# Patient Record
Sex: MALE | Race: WHITE | NOT HISPANIC OR LATINO | Employment: FULL TIME | URBAN - METROPOLITAN AREA
[De-identification: names, ages, dates, MRNs, and addresses within clinical notes are randomized per-mention and may not be internally consistent; named-entity substitution may affect disease eponyms.]

---

## 2024-06-16 ENCOUNTER — OFFICE VISIT (OUTPATIENT)
Dept: URGENT CARE | Facility: CLINIC | Age: 24
End: 2024-06-16
Payer: COMMERCIAL

## 2024-06-16 VITALS
SYSTOLIC BLOOD PRESSURE: 128 MMHG | OXYGEN SATURATION: 98 % | TEMPERATURE: 98.4 F | HEIGHT: 72 IN | BODY MASS INDEX: 16.5 KG/M2 | HEART RATE: 87 BPM | RESPIRATION RATE: 16 BRPM | WEIGHT: 121.8 LBS | DIASTOLIC BLOOD PRESSURE: 72 MMHG

## 2024-06-16 DIAGNOSIS — M79.18 MYOFASCIAL PAIN SYNDROME, CERVICAL: ICD-10-CM

## 2024-06-16 PROCEDURE — 3074F SYST BP LT 130 MM HG: CPT | Performed by: PHYSICIAN ASSISTANT

## 2024-06-16 PROCEDURE — 99203 OFFICE O/P NEW LOW 30 MIN: CPT | Performed by: PHYSICIAN ASSISTANT

## 2024-06-16 PROCEDURE — 3078F DIAST BP <80 MM HG: CPT | Performed by: PHYSICIAN ASSISTANT

## 2024-06-16 RX ORDER — KETOROLAC TROMETHAMINE 30 MG/ML
15 INJECTION, SOLUTION INTRAMUSCULAR; INTRAVENOUS ONCE
Status: COMPLETED | OUTPATIENT
Start: 2024-06-16 | End: 2024-06-16

## 2024-06-16 RX ORDER — CYCLOBENZAPRINE HCL 10 MG
5-10 TABLET ORAL 3 TIMES DAILY PRN
Qty: 30 TABLET | Refills: 0 | Status: SHIPPED | OUTPATIENT
Start: 2024-06-16

## 2024-06-16 RX ORDER — MELOXICAM 15 MG/1
15 TABLET ORAL DAILY
Qty: 30 TABLET | Refills: 0 | Status: SHIPPED | OUTPATIENT
Start: 2024-06-16

## 2024-06-16 RX ADMIN — KETOROLAC TROMETHAMINE 15 MG: 30 INJECTION, SOLUTION INTRAMUSCULAR; INTRAVENOUS at 17:08

## 2024-06-16 NOTE — LETTER
June 16, 2024    To Whom It May Concern:         This is confirmation that Frank Lan attended his scheduled appointment with Abeba Mccain P.A.-C. on 6/16/24.  Please excuse patient from work today.         If you have any questions please do not hesitate to call me at the phone number listed below.    Sincerely,          Abeba Mccain P.A.-C.  [phone]

## 2024-06-16 NOTE — PROGRESS NOTES
"Subjective:   Frank Lan is a 23 y.o. male who presents for Neck Pain (Neck pain that radiates down neck to left side of shoulders x 3 days)     This a pleasant 23-year-old male who presents with left sided neck pain radiates to upper trapezium associated with slight headache.  No injury or trauma. No n/t/w.  Feels \"sore\".  Tried tylenol and hot showers.  Denies upper extremity symptoms.  Denies bowel or bladder dysfunction or gait disequilibrium.  No saddle anesthesia.        Medications:  azithromycin Tabs  ondansetron Tabs  ROBITUSSIN ALLERGY/COUGH PO    Allergies:             Patient has no known allergies.    Surgical History:       No past surgical history on file.    Past Social Hx:  Frank Lan  reports that he has never smoked. He has never used smokeless tobacco. He reports current alcohol use. He reports that he does not currently use drugs.     Past Family Hx:   Frank Lan family history is not on file.       Problem list, medications, and allergies reviewed by myself today in Epic.     Objective:     /72 (BP Location: Left arm, Patient Position: Sitting, BP Cuff Size: Adult)   Pulse 87   Temp 36.9 °C (98.4 °F) (Temporal)   Resp 16   Ht 1.829 m (6')   Wt 55.2 kg (121 lb 12.8 oz)   SpO2 98%   BMI 16.52 kg/m²     Physical Exam  Vitals and nursing note reviewed.   Constitutional:       General: He is not in acute distress.     Appearance: Normal appearance. He is not ill-appearing, toxic-appearing or diaphoretic.   HENT:      Head: Normocephalic.      Right Ear: External ear normal.      Left Ear: External ear normal.      Nose: Nose normal.      Mouth/Throat:      Mouth: Mucous membranes are moist.   Eyes:      Extraocular Movements: Extraocular movements intact.      Conjunctiva/sclera: Conjunctivae normal.      Pupils: Pupils are equal, round, and reactive to light.   Cardiovascular:      Rate and Rhythm: Normal rate.      Pulses: Normal pulses.   Pulmonary:      Effort: Pulmonary " effort is normal. No respiratory distress.   Musculoskeletal:      Cervical back: Normal range of motion.        Back:       Comments: No midline cervical spine tenderness.  Tender to palpation along the left cervical paraspinous musculature and upper trapezium.  Cervical spine range of motion full.  Motor 5/5 bilateral upper extremities.  Sensation intact.  DTRs 1+.  No step-off or deformity.  No crepitus.  Mild pain with extension lateral rotation   Skin:     General: Skin is warm.      Findings: No lesion or rash.   Neurological:      General: No focal deficit present.      Mental Status: He is alert and oriented to person, place, and time.   Psychiatric:         Thought Content: Thought content normal.         Judgment: Judgment normal.         Assessment/Plan:     Diagnosis and Associated Orders:     1. Myofascial pain syndrome, cervical  - cyclobenzaprine (FLEXERIL) 10 mg Tab; Take 0.5-1 Tablets by mouth 3 times a day as needed for Muscle Spasms.  Dispense: 30 Tablet; Refill: 0  - meloxicam (MOBIC) 15 MG tablet; Take 1 Tablet by mouth every day.  Dispense: 30 Tablet; Refill: 0  - Referral to Physical Therapy  - ketorolac (Toradol) injection 15 mg        Comments/MDM:  Patient with several day history of cervical paraspinous discomfort with radiation to the PCM.  Neuro intact with no evidence of motor or sensory deficit to the upper extremities.  DTRs 1+.  No history of trauma or indication for imaging on today's visit.  15 mg IM Toradol administered in clinic.  Flexeril as needed spasm, caution sedation do not drive.  Referral placed to physical therapy.  Work note provided.  Meloxicam, may start tomorrow.  Alternate ice and heat.  Follow-up if symptoms persist or worsen.  I personally reviewed prior external notes and test results pertinent to today's visit. Supportive care, natural history, differential diagnoses, and indications for immediate follow-up discussed. Return to clinic or go to ED if symptoms  worsen or persist.  Red flag symptoms discussed.  Patient/Parent/Guardian voices understanding. Follow-up with your primary care provider in 3-5 days.  All side effects of medication discussed including allergic response, GI upset, tendon injury, rash, sedation etc    Please note that this dictation was created using voice recognition software. I have made a reasonable attempt to correct obvious errors, but I expect that there are errors of grammar and possibly content that I did not discover before finalizing the note.    This note was electronically signed by Abeba Mccain PA-C